# Patient Record
Sex: MALE
[De-identification: names, ages, dates, MRNs, and addresses within clinical notes are randomized per-mention and may not be internally consistent; named-entity substitution may affect disease eponyms.]

---

## 2019-01-23 PROBLEM — Z00.00 ENCOUNTER FOR PREVENTIVE HEALTH EXAMINATION: Status: ACTIVE | Noted: 2019-01-23

## 2019-03-19 ENCOUNTER — APPOINTMENT (OUTPATIENT)
Dept: NEUROLOGY | Facility: CLINIC | Age: 54
End: 2019-03-19

## 2021-01-04 ENCOUNTER — TRANSCRIPTION ENCOUNTER (OUTPATIENT)
Age: 56
End: 2021-01-04

## 2021-02-21 ENCOUNTER — TRANSCRIPTION ENCOUNTER (OUTPATIENT)
Age: 56
End: 2021-02-21

## 2022-02-21 ENCOUNTER — TRANSCRIPTION ENCOUNTER (OUTPATIENT)
Age: 57
End: 2022-02-21

## 2023-01-02 ENCOUNTER — NON-APPOINTMENT (OUTPATIENT)
Age: 58
End: 2023-01-02

## 2023-03-01 ENCOUNTER — NON-APPOINTMENT (OUTPATIENT)
Age: 58
End: 2023-03-01

## 2023-03-02 ENCOUNTER — EMERGENCY (EMERGENCY)
Facility: HOSPITAL | Age: 58
LOS: 1 days | Discharge: HOME | End: 2023-03-02
Attending: EMERGENCY MEDICINE
Payer: COMMERCIAL

## 2023-03-02 VITALS
DIASTOLIC BLOOD PRESSURE: 92 MMHG | HEART RATE: 97 BPM | RESPIRATION RATE: 20 BRPM | SYSTOLIC BLOOD PRESSURE: 138 MMHG | HEIGHT: 71 IN | OXYGEN SATURATION: 96 % | TEMPERATURE: 98 F | WEIGHT: 169.98 LBS

## 2023-03-02 VITALS
RESPIRATION RATE: 20 BRPM | SYSTOLIC BLOOD PRESSURE: 138 MMHG | HEART RATE: 90 BPM | OXYGEN SATURATION: 98 % | TEMPERATURE: 98 F | DIASTOLIC BLOOD PRESSURE: 91 MMHG

## 2023-03-02 DIAGNOSIS — J36 PERITONSILLAR ABSCESS: ICD-10-CM

## 2023-03-02 LAB
ALBUMIN SERPL ELPH-MCNC: 4.2 G/DL — SIGNIFICANT CHANGE UP (ref 3.3–5)
ALP SERPL-CCNC: 49 U/L — SIGNIFICANT CHANGE UP (ref 40–120)
ALT FLD-CCNC: 13 U/L — SIGNIFICANT CHANGE UP (ref 10–45)
ANION GAP SERPL CALC-SCNC: 13 MMOL/L — SIGNIFICANT CHANGE UP (ref 5–17)
AST SERPL-CCNC: 17 U/L — SIGNIFICANT CHANGE UP (ref 10–40)
BASOPHILS # BLD AUTO: 0.06 K/UL — SIGNIFICANT CHANGE UP (ref 0–0.2)
BASOPHILS NFR BLD AUTO: 0.8 % — SIGNIFICANT CHANGE UP (ref 0–2)
BILIRUB SERPL-MCNC: 0.5 MG/DL — SIGNIFICANT CHANGE UP (ref 0.2–1.2)
BUN SERPL-MCNC: 17 MG/DL — SIGNIFICANT CHANGE UP (ref 7–23)
CALCIUM SERPL-MCNC: 9.2 MG/DL — SIGNIFICANT CHANGE UP (ref 8.4–10.5)
CHLORIDE SERPL-SCNC: 101 MMOL/L — SIGNIFICANT CHANGE UP (ref 96–108)
CO2 SERPL-SCNC: 24 MMOL/L — SIGNIFICANT CHANGE UP (ref 22–31)
CREAT SERPL-MCNC: 0.78 MG/DL — SIGNIFICANT CHANGE UP (ref 0.5–1.3)
EGFR: 103 ML/MIN/1.73M2 — SIGNIFICANT CHANGE UP
EOSINOPHIL # BLD AUTO: 0.06 K/UL — SIGNIFICANT CHANGE UP (ref 0–0.5)
EOSINOPHIL NFR BLD AUTO: 0.8 % — SIGNIFICANT CHANGE UP (ref 0–6)
GLUCOSE SERPL-MCNC: 103 MG/DL — HIGH (ref 70–99)
HCT VFR BLD CALC: 42 % — SIGNIFICANT CHANGE UP (ref 39–50)
HGB BLD-MCNC: 13.5 G/DL — SIGNIFICANT CHANGE UP (ref 13–17)
IMM GRANULOCYTES NFR BLD AUTO: 0.3 % — SIGNIFICANT CHANGE UP (ref 0–0.9)
LIDOCAIN IGE QN: 23 U/L — SIGNIFICANT CHANGE UP (ref 7–60)
LYMPHOCYTES # BLD AUTO: 1.88 K/UL — SIGNIFICANT CHANGE UP (ref 1–3.3)
LYMPHOCYTES # BLD AUTO: 23.6 % — SIGNIFICANT CHANGE UP (ref 13–44)
MCHC RBC-ENTMCNC: 29.5 PG — SIGNIFICANT CHANGE UP (ref 27–34)
MCHC RBC-ENTMCNC: 32.1 GM/DL — SIGNIFICANT CHANGE UP (ref 32–36)
MCV RBC AUTO: 91.7 FL — SIGNIFICANT CHANGE UP (ref 80–100)
MONOCYTES # BLD AUTO: 0.73 K/UL — SIGNIFICANT CHANGE UP (ref 0–0.9)
MONOCYTES NFR BLD AUTO: 9.2 % — SIGNIFICANT CHANGE UP (ref 2–14)
NEUTROPHILS # BLD AUTO: 5.22 K/UL — SIGNIFICANT CHANGE UP (ref 1.8–7.4)
NEUTROPHILS NFR BLD AUTO: 65.3 % — SIGNIFICANT CHANGE UP (ref 43–77)
NRBC # BLD: 0 /100 WBCS — SIGNIFICANT CHANGE UP (ref 0–0)
PLATELET # BLD AUTO: 210 K/UL — SIGNIFICANT CHANGE UP (ref 150–400)
POTASSIUM SERPL-MCNC: 3.7 MMOL/L — SIGNIFICANT CHANGE UP (ref 3.5–5.3)
POTASSIUM SERPL-SCNC: 3.7 MMOL/L — SIGNIFICANT CHANGE UP (ref 3.5–5.3)
PROT SERPL-MCNC: 7.1 G/DL — SIGNIFICANT CHANGE UP (ref 6–8.3)
RBC # BLD: 4.58 M/UL — SIGNIFICANT CHANGE UP (ref 4.2–5.8)
RBC # FLD: 13.2 % — SIGNIFICANT CHANGE UP (ref 10.3–14.5)
SARS-COV-2 RNA SPEC QL NAA+PROBE: SIGNIFICANT CHANGE UP
SODIUM SERPL-SCNC: 138 MMOL/L — SIGNIFICANT CHANGE UP (ref 135–145)
WBC # BLD: 7.97 K/UL — SIGNIFICANT CHANGE UP (ref 3.8–10.5)
WBC # FLD AUTO: 7.97 K/UL — SIGNIFICANT CHANGE UP (ref 3.8–10.5)

## 2023-03-02 PROCEDURE — 85025 COMPLETE CBC W/AUTO DIFF WBC: CPT

## 2023-03-02 PROCEDURE — 96375 TX/PRO/DX INJ NEW DRUG ADDON: CPT | Mod: XU

## 2023-03-02 PROCEDURE — 99285 EMERGENCY DEPT VISIT HI MDM: CPT | Mod: 25

## 2023-03-02 PROCEDURE — 42700 I&D ABSCESS PERITONSILLAR: CPT

## 2023-03-02 PROCEDURE — 83690 ASSAY OF LIPASE: CPT

## 2023-03-02 PROCEDURE — 87070 CULTURE OTHR SPECIMN AEROBIC: CPT

## 2023-03-02 PROCEDURE — 99284 EMERGENCY DEPT VISIT MOD MDM: CPT | Mod: 25

## 2023-03-02 PROCEDURE — 87205 SMEAR GRAM STAIN: CPT

## 2023-03-02 PROCEDURE — 87077 CULTURE AEROBIC IDENTIFY: CPT

## 2023-03-02 PROCEDURE — 70491 CT SOFT TISSUE NECK W/DYE: CPT | Mod: MA

## 2023-03-02 PROCEDURE — U0003: CPT

## 2023-03-02 PROCEDURE — 99285 EMERGENCY DEPT VISIT HI MDM: CPT

## 2023-03-02 PROCEDURE — 96365 THER/PROPH/DIAG IV INF INIT: CPT | Mod: XU

## 2023-03-02 PROCEDURE — 80053 COMPREHEN METABOLIC PANEL: CPT

## 2023-03-02 PROCEDURE — 93005 ELECTROCARDIOGRAM TRACING: CPT | Mod: XU

## 2023-03-02 PROCEDURE — U0005: CPT

## 2023-03-02 PROCEDURE — 70491 CT SOFT TISSUE NECK W/DYE: CPT | Mod: 26,MA

## 2023-03-02 RX ORDER — AMPICILLIN SODIUM AND SULBACTAM SODIUM 250; 125 MG/ML; MG/ML
3 INJECTION, POWDER, FOR SUSPENSION INTRAMUSCULAR; INTRAVENOUS ONCE
Refills: 0 | Status: COMPLETED | OUTPATIENT
Start: 2023-03-02 | End: 2023-03-02

## 2023-03-02 RX ORDER — DEXAMETHASONE 0.5 MG/5ML
10 ELIXIR ORAL ONCE
Refills: 0 | Status: COMPLETED | OUTPATIENT
Start: 2023-03-02 | End: 2023-03-02

## 2023-03-02 RX ORDER — KETOROLAC TROMETHAMINE 30 MG/ML
15 SYRINGE (ML) INJECTION ONCE
Refills: 0 | Status: DISCONTINUED | OUTPATIENT
Start: 2023-03-02 | End: 2023-03-02

## 2023-03-02 RX ORDER — CHLORHEXIDINE GLUCONATE 213 G/1000ML
15 SOLUTION TOPICAL
Qty: 420 | Refills: 0
Start: 2023-03-02 | End: 2023-03-08

## 2023-03-02 RX ORDER — SODIUM CHLORIDE 9 MG/ML
1000 INJECTION INTRAMUSCULAR; INTRAVENOUS; SUBCUTANEOUS ONCE
Refills: 0 | Status: COMPLETED | OUTPATIENT
Start: 2023-03-02 | End: 2023-03-02

## 2023-03-02 RX ORDER — TETRACAINE/BENZOCAINE/BUTAMBEN 2%-14%-2%
1 OINTMENT (GRAM) TOPICAL ONCE
Refills: 0 | Status: COMPLETED | OUTPATIENT
Start: 2023-03-02 | End: 2023-03-02

## 2023-03-02 RX ADMIN — SODIUM CHLORIDE 1000 MILLILITER(S): 9 INJECTION INTRAMUSCULAR; INTRAVENOUS; SUBCUTANEOUS at 12:42

## 2023-03-02 RX ADMIN — Medication 10 MILLIGRAM(S): at 13:14

## 2023-03-02 RX ADMIN — Medication 102 MILLIGRAM(S): at 12:44

## 2023-03-02 RX ADMIN — AMPICILLIN SODIUM AND SULBACTAM SODIUM 3 GRAM(S): 250; 125 INJECTION, POWDER, FOR SUSPENSION INTRAMUSCULAR; INTRAVENOUS at 12:13

## 2023-03-02 RX ADMIN — Medication 15 MILLIGRAM(S): at 12:14

## 2023-03-02 RX ADMIN — Medication 15 MILLIGRAM(S): at 12:04

## 2023-03-02 RX ADMIN — Medication 1 SPRAY(S): at 13:14

## 2023-03-02 RX ADMIN — AMPICILLIN SODIUM AND SULBACTAM SODIUM 200 GRAM(S): 250; 125 INJECTION, POWDER, FOR SUSPENSION INTRAMUSCULAR; INTRAVENOUS at 12:04

## 2023-03-02 NOTE — ED PROVIDER NOTE - NSFOLLOWUPINSTRUCTIONS_ED_ALL_ED_FT
You were seen in the emergency department for throat pain. Your workup in the emergency department includes bloodwork and CT scan of neck. The presumed diagnosis is peritonsillar abscess. You can find the results of all the tests in this discharge packet.     For pain, you may take:  1) Acetaminophen (Tylenol): 500mg every 6 hours or as needed for pain   2) Ibuprofen (Advil/Motrin): 400mg every 6 hours or as needed for pain     You were prescribed:   1) Augmentin: take 1 tablet every 12 hours for the next 7 days   2) Prednisone: take 50mg every day for the next 5 days   3) Peridex mouthwash     Please follow up with your primary care doctor within 48 hours for continuation of care.   Please follow up with ENT next week for further management.     Return to the emergency department if you experience any new/concerning/worsening symptoms such as but not limited to: fever (>100.3F), intractable nausea, vomiting, unable to swallow. You were seen in the emergency department for throat pain. Your workup in the emergency department includes bloodwork and CT scan of neck. The presumed diagnosis is peritonsillar abscess. You can find the results of all the tests in this discharge packet.     For pain, you may take:  1) Acetaminophen (Tylenol): 500mg every 6 hours or as needed for pain   2) Ibuprofen (Advil/Motrin): 400mg every 6 hours or as needed for pain     You were prescribed:   1) Clindamycin: take 1 tablet every 12 hours for the next 7 days   2) Prednisone: take 50mg every day for the next 5 days   3) Peridex mouthwash     Please follow up with your primary care doctor within 48 hours for continuation of care.   Please follow up with ENT next week for further management.     Return to the emergency department if you experience any new/concerning/worsening symptoms such as but not limited to: fever (>100.3F), intractable nausea, vomiting, unable to swallow.

## 2023-03-02 NOTE — ED PROVIDER NOTE - PROGRESS NOTE DETAILS
Chandu PGY2   Bloodwork unremarkable. CT neck showed PTA.   PTA drained by ENT at bedside, recommends outpatient follow up with prednisone + peridex + augmentin. Chandu PGY2  ENT recommends clindamycin instead of augmentin; new prescription sent.   Patient continues to be stable and ambulating well in the ED. Will discharge with ENT f/u

## 2023-03-02 NOTE — CONSULT NOTE ADULT - ASSESSMENT
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 States that he had contact with someone who was strep positive at the time and was started on Augmentin on Monday. Neck Ct revealed a 1.7cm abscess S/P bedside I&D. Pt tolerated the procedure well without complications.

## 2023-03-02 NOTE — ED PROVIDER NOTE - OBJECTIVE STATEMENT
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 States that he had contact with someone who was strep positive at the time and was started on Augmentin on Monday. However pain continue to worsen and was unable to swallow yesterday with some voice change. Evaluated by urgent care today and sent in for evaluation of PTA. Denies fever at home, vomiting, chest pain, shortness of breath, abdominal pain.

## 2023-03-02 NOTE — ED PROVIDER NOTE - PHYSICAL EXAMINATION
Vitals: I have reviewed the patients vital signs  General: Well dressed, not in acute respiratory distress  HEENT: Atraumatic, normocephalic, airway patent, R PTA noted, uvula midline   Eyes: EOMI, tracking appropriately  Neck: no tracheal deviation, no JVD  Chest/Lungs: no trauma, symmetric chest rise, speaking in complete sentences, no WOB  Heart: skin and extremities well perfused, regular rate and rhythm  Abdomen: soft and nondistended   Neuro: A+Ox3, ambulating without difficulty, CN grossly intact  MSK: strength at baseline in all extremities, no muscle wasting or atrophy  Skin: no cyanosis, no jaundice, no new emergent lesions

## 2023-03-02 NOTE — CONSULT NOTE ADULT - SUBJECTIVE AND OBJECTIVE BOX
CC: Throat pain, R. 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 States that he had contact with someone who was strep positive at the time and was started on Augmentin on Monday. However pain continue to worsen and was unable to swallow yesterday with some voice change. Evaluated by urgent care today and sent in for evaluation of PTA. Denies fever at home, vomiting, chest pain, shortness of breath, abdominal pain.        PAST MEDICAL & SURGICAL HISTORY:    Allergies    No Known Allergies    Intolerances      MEDICATIONS  (STANDING):    MEDICATIONS  (PRN):      Social History: Unkown if ever smoked    Family history: No pertinent FHx    ROS:   ENT: all negative except as noted in HPI   CV: denies palpitations  Pulm: denies SOB, cough, hemoptysis  GI: denies change in apetite, indigestion, n/v  : denies pertinent urinary symptoms, urgency  Neuro: denies numbness/tingling, loss of sensation  Psych: denies anxiety  MS: denies muscle weakness, instability  Heme: denies easy bruising or bleeding  Endo: denies heat/cold intolerance, excessive sweating  Vascular: denies LE edema    Vital Signs Last 24 Hrs  T(C): 36.8 (02 Mar 2023 10:54), Max: 36.8 (02 Mar 2023 10:54)  T(F): 98.2 (02 Mar 2023 10:54), Max: 98.2 (02 Mar 2023 10:54)  HR: 90 (02 Mar 2023 10:54) (90 - 97)  BP: 138/91 (02 Mar 2023 10:54) (138/91 - 138/92)  BP(mean): --  RR: 20 (02 Mar 2023 10:54) (20 - 20)  SpO2: 98% (02 Mar 2023 10:54) (96% - 98%)    Parameters below as of 02 Mar 2023 10:54  Patient On (Oxygen Delivery Method): room air                              13.5   7.97  )-----------( 210      ( 02 Mar 2023 12:52 )             42.0             PHYSICAL EXAM:  Gen: NAD  Skin: No rashes, bruises, or lesions  Head: Normocephalic, Atraumatic  Face: no edema, erythema, or fluctuance. Parotid glands soft without mass  Eyes: no scleral injection  Ears: Right - ear canal clear, TM intact without effusion or erythema. No evidence of any fluid drainage. No mastoid tenderness, erythema, or ear bulging            Left - ear canal clear, TM intact without effusion or erythema. No evidence of any fluid drainage. No mastoid tenderness, erythema, or ear bulging  Nose: Nares bilaterally patent, no discharge  Mouth: No Stridor / Drooling / Trismus.  Mucosa moist, tongue/uvula midline, oropharynx clear  Neck: Flat, supple, no lymphadenopathy, trachea midline, no masses  Lymphatic: No lymphadenopathy  Resp: breathing easily, no stridor  CV: no peripheral edema/cyanosis  GI: nondistended   Peripheral vascular: no JVD or edema  Neuro: facial nerve intact, no facial droop      Fiberoptic Indirect laryngoscopy:  (Scope #2 used)        IMAGING/ADDITIONAL STUDIES: Pending CC: Throat pain, R. 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 States that he had contact with someone who was strep positive at the time and was started on Augmentin on Monday. However pain continue to worsen and was unable to swallow yesterday with some voice change. Evaluated by urgent care today and sent in for evaluation of PTA. Denies fever at home, vomiting, chest pain, shortness of breath, abdominal pain.        PAST MEDICAL & SURGICAL HISTORY:    Allergies    No Known Allergies    Intolerances      MEDICATIONS  (STANDING):    MEDICATIONS  (PRN):      Social History: Unkown if ever smoked    Family history: No pertinent FHx    ROS:   ENT: all negative except as noted in HPI   CV: denies palpitations  Pulm: denies SOB, cough, hemoptysis  GI: denies change in apetite, indigestion, n/v  : denies pertinent urinary symptoms, urgency  Neuro: denies numbness/tingling, loss of sensation  Psych: denies anxiety  MS: denies muscle weakness, instability  Heme: denies easy bruising or bleeding  Endo: denies heat/cold intolerance, excessive sweating  Vascular: denies LE edema    Vital Signs Last 24 Hrs  T(C): 36.8 (02 Mar 2023 10:54), Max: 36.8 (02 Mar 2023 10:54)  T(F): 98.2 (02 Mar 2023 10:54), Max: 98.2 (02 Mar 2023 10:54)  HR: 90 (02 Mar 2023 10:54) (90 - 97)  BP: 138/91 (02 Mar 2023 10:54) (138/91 - 138/92)  BP(mean): --  RR: 20 (02 Mar 2023 10:54) (20 - 20)  SpO2: 98% (02 Mar 2023 10:54) (96% - 98%)    Parameters below as of 02 Mar 2023 10:54  Patient On (Oxygen Delivery Method): room air                              13.5   7.97  )-----------( 210      ( 02 Mar 2023 12:52 )             42.0             PHYSICAL EXAM:  Gen: NAD  Skin: No rashes, bruises, or lesions  Head: Normocephalic, Atraumatic  Face: no edema, erythema, or fluctuance. Parotid glands soft without mass  Eyes: no scleral injection  Nose: Nares bilaterally patent, no discharge  Mouth: No Stridor / Drooling / Trismus.  Mucosa moist, tongue/uvula midline, oropharynx clear  Neck: Flat, supple, no lymphadenopathy, trachea midline, no masses  Lymphatic: No lymphadenopathy  Resp: breathing easily, no stridor  CV: no peripheral edema/cyanosis  GI: nondistended   Peripheral vascular: no JVD or edema  Neuro: facial nerve intact, no facial droop      Fiberoptic Indirect laryngoscopy:  (Scope #2 used)    Reason for Laryngoscopy:    Patient was unable to cooperate with mirror.  Nasopharynx, oropharynx, and hypopharynx clear, no bleeding. Tongue base, posterior pharyngeal wall, vallecula, epiglottis, and subglottis appear normal. No erythema, edema, pooling of secretions, masses or lesions. Airway patent, no foreign body visualized. No glottic/supraglottic edema. True vocal cords, arytenoids, vestibular folds, ventricles, pyriform sinuses, and aryepiglottic folds appear normal bilaterally. Vocal cords mobile with good contact b/l.      Procedure Note: I&D of PTA    Written Consent obtained from patient.   Cetacaine sprayed onto the R. peritonsillar area. 3cc of 2% lidocaine with epinephrine injected into anterior tonsillar pillar with 10cc syringe and 25G needle. Pus began to drain. Cultures taken. 15 blade used to make lacunar incision and hemostat used to explore wound and break loculations until no more pus was expressed. No active bleeding.   Patient tolerated procedure well.       IMAGING/ADDITIONAL STUDIES: IMPRESSION:    Severely limited evaluation of the oral cavity and oropharynx secondary   to streak artifact from dental hardware. Within these limits:    Enlargement of the right palatine tonsil region with associated hypodense   area measuring up to 1.7 cm, suspicious for peritonsillar abscess (3-66,   602-40). No retropharyngeal abscess.    --- End of Report ---

## 2023-03-02 NOTE — ED CLERICAL - NS ED CLERK NOTE PRE-ARRIVAL INFORMATION; ADDITIONAL PRE-ARRIVAL INFORMATION
CC/Reason For referral:  right sided melita tonsillar abscess on Augmentin x3 days with no relief  Preferred Consultant(if applicable):  Who admits for you (if needed):  Do you have documents you would like to fax over?  Would you still like to speak to an ED attending?  please call after patient is seen

## 2023-03-02 NOTE — CONSULT NOTE ADULT - PROBLEM/RECOMMENDATION-1
Pt called from [place] with complaints of right shoulder pain, radiates down the entire arm. No numbness, erythema, swelling or warmth. Patient reports started about 2-3 days ago. Hurts to lift. Patient feels could have been aggravated from new heavy door at [facility]. Will rest, use heat and ice and is going to try aspercream. Recommended to see urgent care if no improvement or worsening symptoms.    DISPLAY PLAN FREE TEXT

## 2023-03-02 NOTE — ED PROVIDER NOTE - ATTENDING CONTRIBUTION TO CARE
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

## 2023-03-02 NOTE — ED ADULT TRIAGE NOTE - CHIEF COMPLAINT QUOTE
Dx: right Peritonsillar abscess. " needs to be drained". Pain in swallowing. Ability to speak clearly.

## 2023-03-02 NOTE — ED ADULT NURSE NOTE - OBJECTIVE STATEMENT
56 y/o M AAO4 ambulatory presents to the ED after going to [facility] urgent care for throat pain. They saw an abbess on right tonsil which is partially obstructing. Pt is breathing on room air without difficulty. Pt sent from urgent ED for drainage. PMH of espinosa esophagus, HPV of the throat. PSH vocal surgery.    Pt denies CP, SOB, HA, vision changes, n/v/, fevers chills, abdominal pain. Upon assessment, abdomen soft and nontender, +strong peripheral pulses, moving all extremities without difficulty. Pt denies tobacco use but states he vapes a lot.

## 2023-03-02 NOTE — ED PROVIDER NOTE - PATIENT PORTAL LINK FT
You can access the FollowMyHealth Patient Portal offered by [facility] by registering at the following website: [email or web address]. By joining [facility]’s FollowMyHealth portal, you will also be able to view your health information using other applications (apps) compatible with our system.

## 2023-03-04 ENCOUNTER — NON-APPOINTMENT (OUTPATIENT)
Age: 58
End: 2023-03-04

## 2023-03-04 LAB
CULTURE RESULTS: SIGNIFICANT CHANGE UP
SPECIMEN SOURCE: SIGNIFICANT CHANGE UP

## 2023-03-08 PROBLEM — Z00.00 ENCOUNTER FOR PREVENTIVE HEALTH EXAMINATION: Status: ACTIVE | Noted: 2023-03-08

## 2023-03-10 ENCOUNTER — APPOINTMENT (OUTPATIENT)
Dept: OTOLARYNGOLOGY | Facility: CLINIC | Age: 58
End: 2023-03-10
Payer: COMMERCIAL

## 2023-03-10 VITALS
SYSTOLIC BLOOD PRESSURE: 146 MMHG | DIASTOLIC BLOOD PRESSURE: 90 MMHG | TEMPERATURE: 98.1 F | HEART RATE: 134 BPM | HEIGHT: 71 IN

## 2023-03-10 DIAGNOSIS — R49.0 DYSPHONIA: ICD-10-CM

## 2023-03-10 DIAGNOSIS — J36 PERITONSILLAR ABSCESS: ICD-10-CM

## 2023-03-10 DIAGNOSIS — Z72.0 TOBACCO USE: ICD-10-CM

## 2023-03-10 DIAGNOSIS — F10.21 ALCOHOL DEPENDENCE, IN REMISSION: ICD-10-CM

## 2023-03-10 PROCEDURE — 99024 POSTOP FOLLOW-UP VISIT: CPT

## 2023-03-10 RX ORDER — CLINDAMYCIN HYDROCHLORIDE 300 MG/1
CAPSULE ORAL
Refills: 0 | Status: ACTIVE | COMMUNITY

## 2023-03-12 PROBLEM — J36 PERITONSILLAR ABSCESS: Status: ACTIVE | Noted: 2023-03-10

## 2023-03-12 PROBLEM — R49.0 VOICE HOARSENESS: Status: ACTIVE | Noted: 2023-03-10

## 2023-11-24 ENCOUNTER — NON-APPOINTMENT (OUTPATIENT)
Age: 58
End: 2023-11-24

## 2023-12-05 ENCOUNTER — NON-APPOINTMENT (OUTPATIENT)
Age: 58
End: 2023-12-05